# Patient Record
Sex: FEMALE | Race: BLACK OR AFRICAN AMERICAN | NOT HISPANIC OR LATINO | Employment: UNEMPLOYED | URBAN - METROPOLITAN AREA
[De-identification: names, ages, dates, MRNs, and addresses within clinical notes are randomized per-mention and may not be internally consistent; named-entity substitution may affect disease eponyms.]

---

## 2021-07-30 VITALS
TEMPERATURE: 97.3 F | SYSTOLIC BLOOD PRESSURE: 111 MMHG | DIASTOLIC BLOOD PRESSURE: 73 MMHG | RESPIRATION RATE: 18 BRPM | HEART RATE: 71 BPM | OXYGEN SATURATION: 100 %

## 2021-07-31 ENCOUNTER — HOSPITAL ENCOUNTER (EMERGENCY)
Facility: CLINIC | Age: 21
Discharge: LEFT BEFORE DISCHARGE | End: 2021-07-31
Payer: MEDICAID

## 2021-07-31 ENCOUNTER — HOSPITAL ENCOUNTER (EMERGENCY)
Facility: CLINIC | Age: 21
Discharge: HOME | End: 2021-07-31
Attending: NURSE PRACTITIONER | Admitting: NURSE PRACTITIONER
Payer: MEDICAID

## 2021-07-31 VITALS
RESPIRATION RATE: 16 BRPM | OXYGEN SATURATION: 99 % | SYSTOLIC BLOOD PRESSURE: 115 MMHG | DIASTOLIC BLOOD PRESSURE: 80 MMHG | WEIGHT: 145 LBS | TEMPERATURE: 98.3 F | HEART RATE: 75 BPM

## 2021-07-31 DIAGNOSIS — Z51.89 VISIT FOR WOUND CHECK: ICD-10-CM

## 2021-07-31 PROBLEM — D57.3 SICKLE CELL TRAIT (H): Status: ACTIVE | Noted: 2020-12-07

## 2021-07-31 PROBLEM — F31.9 BIPOLAR DISORDER (H): Status: ACTIVE | Noted: 2020-06-05

## 2021-07-31 PROCEDURE — 99282 EMERGENCY DEPT VISIT SF MDM: CPT

## 2021-07-31 ASSESSMENT — ENCOUNTER SYMPTOMS
NUMBNESS: 1
WOUND: 1
WEAKNESS: 0
COLOR CHANGE: 0

## 2021-07-31 NOTE — ED TRIAGE NOTES
C/O small spot on L lateral ankle. Pt states it was bleeding, throbbing and burning. Pt denies injury and concerned with infection. Pt also concerned with pregnancy and requesting a test. ABC in tact. A/Ox4

## 2021-07-31 NOTE — ED TRIAGE NOTES
Pt arrives with c/o left foot pain for 1 week. Pt reports some intermittent tingling and throbbing to the left foot/leg since. Pt has not taken any OTC pain medications. Pt ambulatory in triage. ABCs intact.

## 2021-07-31 NOTE — ED PROVIDER NOTES
"  History   Chief Complaint:  Wound Check      HPI   Rachid Peterson is an otherwise healthy 21 year old female who presents to the ED for evaluation of wound check. The patient has a small sore on her left ankle. States \"it sometimes goes numb, throbs, and has a sharp pain.\" Denies any injury or concern for infection.     Review of Systems   Cardiovascular: Negative for leg swelling.   Skin: Positive for wound. Negative for color change and rash.   Neurological: Positive for numbness. Negative for weakness.   All other systems reviewed and are negative.    Allergies:  No Known Allergies    Medications:    Abilify    Past Medical History:    Sickle cell trait  Depression  Anxiety  Bipolar disorder  ADHD    Past Surgical History:    [place] teeth extraction    Family History:    Diabetes mellitus type I  Drug abuse  Mental illness  Alcoholism    Social History:  Marital Status: Single  Presents to the ED alone    Physical Exam     Patient Vitals for the past 24 hrs:   BP Temp Temp src Pulse Resp SpO2 Weight   07/31/21 1455 115/80 -- -- 75 16 99 % --   07/31/21 1442 -- 98.3  F (36.8  C) Temporal 80 16 100 % 65.8 kg (145 lb)       Physical Exam  General: Alert, No obvious discomfort, well kept   HENT:  Normal voice, No lymphadenopathy  Eyes:  The pupils are equal, round, and reactive to light, Conjunctiva normal, No scleral icterus   Neck:  Normal range of motion  CV:  Normal Pulses  Resp:  Non-labored, No cough  MS:  Normal muscular tone, moves all extremities  Skin:  No rash or acute skin lesions noted, small less than half centimeter well-healing wound.  There is a small scab over the area.  There is no surrounding erythema or fluctuance.  No edema.  Patient is ambulatory without difficulty.  Neuro:  Speech is normal and fluent, normal sensation  Psych: Awake. Alert.  Normal affect.  Appropriate interactions. Good eye contact.    Emergency Department Course     Emergency Department Course:    Reviewed:  I " reviewed the patient's nursing notes, vitals, past medical records, Care Everywhere.     Assessments:  1449 I obtained history and examined them as noted above.     Disposition:  The patient was discharged to home.     Impression & Plan      Medical Decision Making:   Rachid Peterson is a 21 year old female who presents with concern for wound healing.  She does not recall when she had a small injury to the lateral malleolar area of her right lower extremity but noted that it was bleeding quite a bit first day.  Since that time she has had no bleeding with using Band-Aids.  She has stated that intermittently she will feel a sharp shooting pain from that area up her leg.  She is otherwise ambulatory without difficulty.  She has had no fevers drainage or swelling around the site.  Her main concern was if there was signs of infection.  She has normal sensation on exam.  There is no indication for further testing or imaging.  She appears to be safe and appropriate for outpatient management follow-up and is discharged home.    Diagnosis:     ICD-10-CM    1. Visit for wound check  Z51.89      Scribe Disclosure:  I, Cherelle Gonzáles, am serving as a scribe on 7/31/2021 at 2:49 PM to personally document services performed by Long Jimenez APRN based on my observations and the provider's statements to me.           Long Jimenez APRN CNP  07/31/21 7945

## 2021-10-31 ENCOUNTER — APPOINTMENT (OUTPATIENT)
Dept: ULTRASOUND IMAGING | Facility: CLINIC | Age: 21
End: 2021-10-31
Attending: EMERGENCY MEDICINE

## 2021-10-31 ENCOUNTER — HOSPITAL ENCOUNTER (EMERGENCY)
Facility: CLINIC | Age: 21
Discharge: HOME | End: 2021-10-31
Attending: EMERGENCY MEDICINE | Admitting: EMERGENCY MEDICINE

## 2021-10-31 VITALS
RESPIRATION RATE: 16 BRPM | TEMPERATURE: 98.5 F | OXYGEN SATURATION: 100 % | DIASTOLIC BLOOD PRESSURE: 71 MMHG | HEART RATE: 62 BPM | SYSTOLIC BLOOD PRESSURE: 103 MMHG

## 2021-10-31 DIAGNOSIS — N39.0 URINARY TRACT INFECTION WITHOUT HEMATURIA, SITE UNSPECIFIED: ICD-10-CM

## 2021-10-31 DIAGNOSIS — R10.9 ABDOMINAL CRAMPING: ICD-10-CM

## 2021-10-31 LAB
ALBUMIN UR-MCNC: NEGATIVE MG/DL
APPEARANCE UR: CLEAR
BACTERIA #/AREA URNS HPF: ABNORMAL /HPF
BILIRUB UR QL STRIP: NEGATIVE
CLUE CELLS: ABNORMAL
COLOR UR AUTO: ABNORMAL
GLUCOSE UR STRIP-MCNC: NEGATIVE MG/DL
HCG UR QL: NEGATIVE
HGB UR QL STRIP: NEGATIVE
KETONES UR STRIP-MCNC: NEGATIVE MG/DL
LEUKOCYTE ESTERASE UR QL STRIP: ABNORMAL
MUCOUS THREADS #/AREA URNS LPF: PRESENT /LPF
NITRATE UR QL: POSITIVE
PH UR STRIP: 6.5 [PH] (ref 5–7)
RBC URINE: <1 /HPF
SP GR UR STRIP: 1.01 (ref 1–1.03)
SQUAMOUS EPITHELIAL: 2 /HPF
TRICHOMONAS, WET PREP: ABNORMAL
UROBILINOGEN UR STRIP-MCNC: NORMAL MG/DL
WBC URINE: 5 /HPF
WBC'S/HIGH POWER FIELD, WET PREP: ABNORMAL
YEAST, WET PREP: ABNORMAL

## 2021-10-31 PROCEDURE — 81001 URINALYSIS AUTO W/SCOPE: CPT | Performed by: EMERGENCY MEDICINE

## 2021-10-31 PROCEDURE — 87210 SMEAR WET MOUNT SALINE/INK: CPT | Performed by: EMERGENCY MEDICINE

## 2021-10-31 PROCEDURE — 87086 URINE CULTURE/COLONY COUNT: CPT | Performed by: EMERGENCY MEDICINE

## 2021-10-31 PROCEDURE — 81025 URINE PREGNANCY TEST: CPT | Performed by: EMERGENCY MEDICINE

## 2021-10-31 PROCEDURE — 87591 N.GONORRHOEAE DNA AMP PROB: CPT | Performed by: EMERGENCY MEDICINE

## 2021-10-31 PROCEDURE — 93005 ELECTROCARDIOGRAM TRACING: CPT

## 2021-10-31 PROCEDURE — 76830 TRANSVAGINAL US NON-OB: CPT

## 2021-10-31 PROCEDURE — 87491 CHLMYD TRACH DNA AMP PROBE: CPT | Performed by: EMERGENCY MEDICINE

## 2021-10-31 PROCEDURE — 99285 EMERGENCY DEPT VISIT HI MDM: CPT | Mod: 25

## 2021-10-31 RX ORDER — CEPHALEXIN 500 MG/1
500 CAPSULE ORAL 2 TIMES DAILY
Qty: 14 CAPSULE | Refills: 0 | Status: SHIPPED | OUTPATIENT
Start: 2021-10-31 | End: 2021-11-07

## 2021-10-31 ASSESSMENT — ENCOUNTER SYMPTOMS
DYSURIA: 1
ABDOMINAL PAIN: 1

## 2021-10-31 NOTE — ED PROVIDER NOTES
History   Chief Complaint:  Vaginal Problem    The history is provided by the patient.      Rachid Peterson is a 21 year old female with history of sickle cell trait who presents alone for a vaginal problem. In the beginning of October, she went to [place] and during her menstrual cycle, she had 14 days of vaginal bleeding. She was instructed to take her birth control, and her bleeding resolved. However, shortly after, she noted dysuria and a fish odor with white vaginal discharge, but states the discharge is normal for her. She also endorses vaginal and abdominal pain described as cramping. In addition, is unsure of any possibility of pregnancy. Of note, she has a history of gonorrhea treated over the summer, bacterial vaginosis, and yeast infections. She is sexually active with 1 partner.    Review of Systems   Gastrointestinal: Positive for abdominal pain.   Genitourinary: Positive for dysuria, menstrual problem, vaginal bleeding and vaginal pain.        Negative for abnormal vaginal discharge   All other systems reviewed and are negative.      Allergies:  There are no known allergies.    Medications:  Birth control    Past Medical History:     Sickle cell trait   Bipolar disorder  ADHD    Social History:  The patient arrived alone.  The patient is sexually active    Physical Exam     Patient Vitals for the past 24 hrs:   BP Temp Temp src Pulse Resp SpO2   10/31/21 1230 -- -- -- -- 16 100 %   10/31/21 1145 103/71 -- -- 62 -- --   10/31/21 1140 103/71 98.5  F (36.9  C) Oral 72 16 100 %   10/31/21 0935 122/77 99.6  F (37.6  C) Oral 76 -- 100 %       Physical Exam  General:              Well-nourished              Speaking in full sentences  Eyes:              Conjunctiva without injection or scleral icterus  ENT:              Moist mucous membranes              Nares patent              Pinnae normal  Neck:              Full ROM              No stiffness appreciated  Resp:              Lungs CTAB               No crackles, wheezing or audible rubs              Good air movement  CV:                    Normal rate, regular rhythm              S1 and S2 present              No murmur, gallop or rub  GI:              BS present              Abdomen soft without distention              Mild supra-pubic tenderness to palpation              No upper abd pain              No guarding or rebound tenderness  :              (With female chaperone present)              Unremarkable external genitalia              No open wounds or sores              White vaginal discharge noted              Mild supra-pubic tenderness              No CMT, no adnexal tenderness  Skin:              Warm, dry, well perfused              No rashes or open wounds on exposed skin  MSK:              Moves all extremities              No focal deformities or swelling  Neuro:              Alert              Answers questions appropriately              Moves all extremities equally              Gait stable  Psych:              Normal affect, normal mood    Emergency Department Course     Imaging:  US Pelvic Complete w Transvaginal & Abd/Pel Duplex Limited   Final Result   IMPRESSION:     1.  Trace pelvic free fluid likely physiologic. Otherwise, unremarkable pelvic ultrasound.                      Report per radiology    Laboratory:  Labs Ordered and Resulted from Time of ED Arrival to Time of ED Departure   ROUTINE UA WITH MICROSCOPIC REFLEX TO CULTURE - Abnormal       Result Value    Color Urine Light Yellow      Appearance Urine Clear      Glucose Urine Negative      Bilirubin Urine Negative      Ketones Urine Negative      Specific Gravity Urine 1.012      Blood Urine Negative      pH Urine 6.5      Protein Albumin Urine Negative      Urobilinogen Urine Normal      Nitrite Urine Positive (*)     Leukocyte Esterase Urine Small (*)     Bacteria Urine Many (*)     Mucus Urine Present (*)     RBC Urine <1      WBC Urine 5      Squamous Epithelials Urine  2 (*)    WET PREPARATION - Abnormal    Trichomonas Absent      Yeast Absent      Clue Cells Absent      WBCs/high power field 3+ (*)    HCG QUALITATIVE URINE - Normal    hCG Urine Qualitative Negative     CHLAMYDIA TRACHOMATIS PCR   NEISSERIA GONORRHOEAE PCR   URINE CULTURE     Emergency Department Course:  Reviewed:  I reviewed nursing notes, vitals and past medical history    Assessments:  0930 I obtained history and examined the patient as noted above.   1103 I performed a pelvic exam of the patient as documented above.  1310 I rechecked the patient and explained findings.     Disposition:  The patient was discharged to home.     Impression & Plan     Medical Decision Making:  Rachid Peterson is a pleasant 21 yr old F presenting to the ER for evaluation of a vaginal problem.  VS on presentation are unremarkable.  History, exam, and ED course as outlined above.  Work-up is concerning for UTI, based on UA and symptoms.  UC added to ensure appropriate therapy.  UPT negative.  Wet prep negative for BV, yeast or trichomonas.  Pelvic US with trace fluid, though otherwise unremarkable, and without evidence of cyst, or suggestion of torsion.  Remainder of abdominal exam soft, and non-tender, arguing against acute surgical intra-abdominal pathology.  Exam findings not suggestive of PID.  Will plan DC home with oral Keflex BID x 7 days. GC/Chlamydia testing pending.  Return to ER with worsening pain, discomfort, fevers, development of flank pain, nausea, vomiting, or any other concerns.  Questions answered prior to discharge.     Diagnosis:    ICD-10-CM    1. Urinary tract infection without hematuria, site unspecified  N39.0    2. Abdominal cramping  R10.9        Discharge Medications:  Discharge Medication List as of 10/31/2021  1:13 PM      START taking these medications    Details   cephALEXin (KEFLEX) 500 MG capsule Take 1 capsule (500 mg) by mouth 2 times daily for 7 days, Disp-14 capsule, R-0, E-Prescribe              Scribe Disclosure:  I, Leo Robledo () and Randy Gallegos (trainee), am serving as a scribe at 9:49 AM on 10/31/2021 to document services personally performed by Johnny Connor MD based on my observations and the provider's statements to me.          Johnny Connor MD  11/01/21 0718

## 2021-10-31 NOTE — DISCHARGE INSTRUCTIONS
Please begin the antibiotic as discussed.    Please follow-up with your primary care provider in 2 to 3 days for recheck.    Return to the ER if you develop worsening pain, cramping, bleeding, or any other concerns.    Discharge Instructions  Urinary Tract Infection  You or your child have been diagnosed with a urinary tract infection, or UTI. The urinary tract includes the kidneys (which make urine/pee), ureters (the tubes that carry urine/pee from the kidneys to the bladder), the bladder (which stores urine/pee), and urethra (the tube that carries urine/pee out of the bladder). Urinary tract infections occur when bacteria travel up the urethra into the bladder (bladder infection) and, in some cases, from there into the kidneys (kidney infection).  Generally, every Emergency Department visit should have a follow-up clinic visit with either a primary or a specialty clinic/provider. Please follow-up as instructed by your emergency provider today.  Return to the Emergency Department if:  You or your child have severe back pain.  You or your child are vomiting (throwing up) so that you cannot take your medicine.  You or your child have a new fever (had not previously had a fever) over 101 F.  You or your child have confusion or are very weak, or feel very ill.  Your child seems much more ill, will not wake up, will not respond right, or is crying for a long time and will not calm down.  You or your child are showing signs of dehydration. These signs may include decreased urination (pee), dry mouth/gums/tongue, or decreased activity.    Follow-up with your provider:   Children under 24 months need to be seen by their regular provider within one week after a diagnosis of a UTI. It may be necessary to do some more tests to look at the child s kidney or bladder.  You should begin to feel better within 24 - 48 hours of starting your antibiotic; follow-up with your regular clinic/doctor/provider if this is not the  case.    Treatment:   You will be treated with an antibiotic to kill the bacteria. We have to make an educated guess, based on what we know about common bacteria and antibiotics, as to which antibiotic will work for your infection. We will be correct most times but there will be some cases where the antibiotic chosen is not correct (see urine cultures below).  Take a pain medication such as acetaminophen (Tylenol ) or ibuprofen (Advil , Motrin , Nuprin ).  Phenazopyridine (Pyridium , Uristat ) is a prescription medication that numbs the bladder to reduce the burning pain of some UTIs.  The same medication is available in a non-prescription version (Azo-Standard , Urodol ). This medication will change the color of the urine and tears (usually blue or orange). If you wear contacts, do not wear them while taking this medication as they may be stained by the medication.    Urine Cultures:  If indicated, a urine culture may have been performed today. This test generally takes 24-48 hours to complete so the results are not known at this time. The results can confirm that an infection is present but also determine which antibiotic is effective for the specific bacteria that is causing the infection. If your urine culture shows that the antibiotic you were given today will not work to treat your infection, we will attempt to contact you to make arrangements to change the antibiotic. If the culture confirms that the antibiotic is effective for your infection, you will not be contacted. We often recommend follow-up with your regular physician/provider on the culture results regardless of this process.    Antibiotic Warning:   If you have been placed on antibiotics - watch for signs of allergic reaction.  These include rash, lip swelling, difficulty breathing, wheezing, and dizziness.  If you develop any of these symptoms, stop the antibiotic immediately and go to an emergency room or urgent care for evaluation.    Probiotics:  "If you have been given an antibiotic, you may want to also take a probiotic pill or eat yogurt with live cultures. Probiotics have \"good bacteria\" to help your intestines stay healthy. Studies have shown that probiotics help prevent diarrhea and other intestine problems (including C. diff infection) when you take antibiotics. You can buy these without a prescription in the pharmacy section of the store.   If you were given a prescription for medicine here today, be sure to read all of the information (including the package insert) that comes with your prescription.  This will include important information about the medicine, its side effects, and any warnings that you need to know about.  The pharmacist who fills the prescription can provide more information and answer questions you may have about the medicine.  If you have questions or concerns that the pharmacist cannot address, please call or return to the Emergency Department.   Remember that you can always come back to the Emergency Department if you are not able to see your regular provider in the amount of time listed above, if you get any new symptoms, or if there is anything that worries you.    "

## 2021-10-31 NOTE — ED TRIAGE NOTES
Onset 10 October, Pt states intermittent vaginal fishy odor, mild itchiness, irritation, worse with urination. States pelvic cramping. Denies vaginal bleeding. LMP 10 October. States urine pregnancy test negative in October.

## 2021-11-01 LAB
C TRACH DNA SPEC QL NAA+PROBE: NEGATIVE
N GONORRHOEA DNA SPEC QL NAA+PROBE: NEGATIVE

## 2021-11-01 NOTE — RESULT ENCOUNTER NOTE
Final result for both N. Gonorrhoeae PCR and Chlamydia Trachomatis PCR are NEGATIVE.  No treatment or change in treatment per [facility] ED Lab Result N. Gonorrhea AND/OR Chlamydia T. protocol.

## 2021-11-02 LAB — BACTERIA UR CULT: ABNORMAL
